# Patient Record
Sex: FEMALE | Race: WHITE | NOT HISPANIC OR LATINO | Employment: UNEMPLOYED | URBAN - METROPOLITAN AREA
[De-identification: names, ages, dates, MRNs, and addresses within clinical notes are randomized per-mention and may not be internally consistent; named-entity substitution may affect disease eponyms.]

---
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
## 2021-05-03 ENCOUNTER — HOSPITAL ENCOUNTER (EMERGENCY)
Facility: HOSPITAL | Age: 21
Discharge: HOME | End: 2021-05-04
Attending: EMERGENCY MEDICINE
Payer: COMMERCIAL

## 2021-05-03 DIAGNOSIS — R20.0 LEFT SIDED NUMBNESS: ICD-10-CM

## 2021-05-03 DIAGNOSIS — R51.9 ACUTE NONINTRACTABLE HEADACHE, UNSPECIFIED HEADACHE TYPE: ICD-10-CM

## 2021-05-03 DIAGNOSIS — H57.04 MYDRIASIS: Primary | ICD-10-CM

## 2021-05-03 LAB
ALBUMIN SERPL BCP-MCNC: 4.3 G/DL (ref 3.5–5.2)
ALP SERPL-CCNC: 59 U/L (ref 55–135)
ALT SERPL W/O P-5'-P-CCNC: 16 U/L (ref 10–44)
ANION GAP SERPL CALC-SCNC: 11 MMOL/L (ref 8–16)
APTT PPP: 29.1 SEC (ref 25.6–35.8)
AST SERPL-CCNC: 19 U/L (ref 10–40)
B-HCG UR QL: NEGATIVE
BASOPHILS # BLD AUTO: 0.03 K/UL (ref 0–0.2)
BASOPHILS NFR BLD: 0.4 % (ref 0–1.9)
BILIRUB SERPL-MCNC: 0.9 MG/DL (ref 0.1–1)
BUN SERPL-MCNC: 10 MG/DL (ref 6–20)
CALCIUM SERPL-MCNC: 9.1 MG/DL (ref 8.7–10.5)
CHLORIDE SERPL-SCNC: 106 MMOL/L (ref 95–110)
CO2 SERPL-SCNC: 25 MMOL/L (ref 23–29)
CREAT SERPL-MCNC: 0.9 MG/DL (ref 0.5–1.4)
CTP QC/QA: YES
DIFFERENTIAL METHOD: NORMAL
EOSINOPHIL # BLD AUTO: 0 K/UL (ref 0–0.5)
EOSINOPHIL NFR BLD: 0.4 % (ref 0–8)
ERYTHROCYTE [DISTWIDTH] IN BLOOD BY AUTOMATED COUNT: 11.7 % (ref 11.5–14.5)
EST. GFR  (AFRICAN AMERICAN): >60 ML/MIN/1.73 M^2
EST. GFR  (NON AFRICAN AMERICAN): >60 ML/MIN/1.73 M^2
GLUCOSE SERPL-MCNC: 96 MG/DL (ref 70–110)
HCT VFR BLD AUTO: 40.3 % (ref 37–48.5)
HGB BLD-MCNC: 13.8 G/DL (ref 12–16)
IMM GRANULOCYTES # BLD AUTO: 0.01 K/UL (ref 0–0.04)
IMM GRANULOCYTES NFR BLD AUTO: 0.1 % (ref 0–0.5)
INR PPP: 1.2
LYMPHOCYTES # BLD AUTO: 2.7 K/UL (ref 1–4.8)
LYMPHOCYTES NFR BLD: 36.3 % (ref 18–48)
MCH RBC QN AUTO: 30.9 PG (ref 27–31)
MCHC RBC AUTO-ENTMCNC: 34.2 G/DL (ref 32–36)
MCV RBC AUTO: 90 FL (ref 82–98)
MONOCYTES # BLD AUTO: 0.4 K/UL (ref 0.3–1)
MONOCYTES NFR BLD: 4.6 % (ref 4–15)
NEUTROPHILS # BLD AUTO: 4.4 K/UL (ref 1.8–7.7)
NEUTROPHILS NFR BLD: 58.2 % (ref 38–73)
NRBC BLD-RTO: 0 /100 WBC
PLATELET # BLD AUTO: 187 K/UL (ref 150–450)
PMV BLD AUTO: 12.5 FL (ref 9.2–12.9)
POTASSIUM SERPL-SCNC: 3.1 MMOL/L (ref 3.5–5.1)
PROT SERPL-MCNC: 7.1 G/DL (ref 6–8.4)
PROTHROMBIN TIME: 14.6 SEC (ref 11.8–14.3)
RBC # BLD AUTO: 4.47 M/UL (ref 4–5.4)
SODIUM SERPL-SCNC: 142 MMOL/L (ref 136–145)
WBC # BLD AUTO: 7.54 K/UL (ref 3.9–12.7)

## 2021-05-03 PROCEDURE — 85610 PROTHROMBIN TIME: CPT | Performed by: STUDENT IN AN ORGANIZED HEALTH CARE EDUCATION/TRAINING PROGRAM

## 2021-05-03 PROCEDURE — 25000003 PHARM REV CODE 250

## 2021-05-03 PROCEDURE — 81025 URINE PREGNANCY TEST: CPT | Performed by: NURSE PRACTITIONER

## 2021-05-03 PROCEDURE — 36415 COLL VENOUS BLD VENIPUNCTURE: CPT | Performed by: STUDENT IN AN ORGANIZED HEALTH CARE EDUCATION/TRAINING PROGRAM

## 2021-05-03 PROCEDURE — 85025 COMPLETE CBC W/AUTO DIFF WBC: CPT | Performed by: STUDENT IN AN ORGANIZED HEALTH CARE EDUCATION/TRAINING PROGRAM

## 2021-05-03 PROCEDURE — 99285 EMERGENCY DEPT VISIT HI MDM: CPT | Mod: 25

## 2021-05-03 PROCEDURE — 25500020 PHARM REV CODE 255: Performed by: EMERGENCY MEDICINE

## 2021-05-03 PROCEDURE — 80053 COMPREHEN METABOLIC PANEL: CPT | Performed by: STUDENT IN AN ORGANIZED HEALTH CARE EDUCATION/TRAINING PROGRAM

## 2021-05-03 PROCEDURE — 80307 DRUG TEST PRSMV CHEM ANLYZR: CPT | Performed by: NURSE PRACTITIONER

## 2021-05-03 PROCEDURE — 85730 THROMBOPLASTIN TIME PARTIAL: CPT | Performed by: STUDENT IN AN ORGANIZED HEALTH CARE EDUCATION/TRAINING PROGRAM

## 2021-05-03 RX ADMIN — IOHEXOL 65 ML: 350 INJECTION, SOLUTION INTRAVENOUS at 11:05

## 2021-05-04 VITALS
DIASTOLIC BLOOD PRESSURE: 72 MMHG | HEIGHT: 61 IN | TEMPERATURE: 98 F | HEART RATE: 69 BPM | RESPIRATION RATE: 18 BRPM | WEIGHT: 142 LBS | BODY MASS INDEX: 26.81 KG/M2 | SYSTOLIC BLOOD PRESSURE: 129 MMHG | OXYGEN SATURATION: 97 %

## 2021-05-04 LAB
AMPHET+METHAMPHET UR QL: NEGATIVE
BARBITURATES UR QL SCN>200 NG/ML: NEGATIVE
BENZODIAZ UR QL SCN>200 NG/ML: NEGATIVE
BZE UR QL SCN: NEGATIVE
CANNABINOIDS UR QL SCN: NEGATIVE
CREAT UR-MCNC: 105 MG/DL (ref 15–325)
OPIATES UR QL SCN: NEGATIVE
PCP UR QL SCN>25 NG/ML: NEGATIVE
TOXICOLOGY INFORMATION: NORMAL

## 2021-05-04 PROCEDURE — 25000003 PHARM REV CODE 250: Performed by: STUDENT IN AN ORGANIZED HEALTH CARE EDUCATION/TRAINING PROGRAM

## 2021-05-04 PROCEDURE — 96374 THER/PROPH/DIAG INJ IV PUSH: CPT

## 2021-05-04 PROCEDURE — 63600175 PHARM REV CODE 636 W HCPCS: Performed by: STUDENT IN AN ORGANIZED HEALTH CARE EDUCATION/TRAINING PROGRAM

## 2021-05-04 RX ORDER — KETOROLAC TROMETHAMINE 30 MG/ML
15 INJECTION, SOLUTION INTRAMUSCULAR; INTRAVENOUS
Status: COMPLETED | OUTPATIENT
Start: 2021-05-04 | End: 2021-05-04

## 2021-05-04 RX ORDER — TETRACAINE HYDROCHLORIDE 5 MG/ML
2 SOLUTION OPHTHALMIC
Status: DISCONTINUED | OUTPATIENT
Start: 2021-05-04 | End: 2021-05-04 | Stop reason: HOSPADM

## 2021-05-04 RX ADMIN — FLUORESCEIN SODIUM 1 EACH: 1 STRIP OPHTHALMIC at 12:05

## 2021-05-04 RX ADMIN — KETOROLAC TROMETHAMINE 15 MG: 30 INJECTION, SOLUTION INTRAMUSCULAR; INTRAVENOUS at 12:05

## 2023-12-08 ENCOUNTER — HOSPITAL ENCOUNTER (EMERGENCY)
Facility: HOSPITAL | Age: 23
Discharge: LEFT BEFORE DISCHARGE | End: 2023-12-08
Payer: COMMERCIAL

## 2023-12-08 ENCOUNTER — HOSPITAL ENCOUNTER (EMERGENCY)
Facility: HOSPITAL | Age: 23
Discharge: HOME | End: 2023-12-09
Attending: STUDENT IN AN ORGANIZED HEALTH CARE EDUCATION/TRAINING PROGRAM
Payer: COMMERCIAL

## 2023-12-08 VITALS
HEIGHT: 60 IN | SYSTOLIC BLOOD PRESSURE: 128 MMHG | BODY MASS INDEX: 27.09 KG/M2 | OXYGEN SATURATION: 95 % | HEART RATE: 82 BPM | WEIGHT: 138 LBS | TEMPERATURE: 98 F | RESPIRATION RATE: 16 BRPM | DIASTOLIC BLOOD PRESSURE: 85 MMHG

## 2023-12-08 DIAGNOSIS — Z30.432 ENCOUNTER FOR IUD REMOVAL: ICD-10-CM

## 2023-12-08 DIAGNOSIS — R10.2 PELVIC PAIN: Primary | ICD-10-CM

## 2023-12-08 LAB
B-HCG UR QL: NEGATIVE
BILIRUB UR QL STRIP: NEGATIVE
CLARITY UR: CLEAR
COLOR UR: YELLOW
CTP QC/QA: YES
GLUCOSE UR QL STRIP: NEGATIVE
HGB UR QL STRIP: NEGATIVE
KETONES UR QL STRIP: NEGATIVE
LEUKOCYTE ESTERASE UR QL STRIP: NEGATIVE
NITRITE UR QL STRIP: NEGATIVE
PH UR STRIP: 7 [PH] (ref 5–8)
PROT UR QL STRIP: NEGATIVE
SP GR UR STRIP: 1.02 (ref 1–1.03)
URN SPEC COLLECT METH UR: NORMAL
UROBILINOGEN UR STRIP-ACNC: NEGATIVE EU/DL

## 2023-12-08 PROCEDURE — 99284 EMERGENCY DEPT VISIT MOD MDM: CPT

## 2023-12-08 PROCEDURE — 99281 EMR DPT VST MAYX REQ PHY/QHP: CPT

## 2023-12-08 PROCEDURE — 58301 REMOVE INTRAUTERINE DEVICE: CPT

## 2023-12-08 PROCEDURE — 81025 URINE PREGNANCY TEST: CPT | Performed by: NURSE PRACTITIONER

## 2023-12-08 PROCEDURE — 81003 URINALYSIS AUTO W/O SCOPE: CPT | Performed by: NURSE PRACTITIONER

## 2023-12-08 NOTE — FIRST PROVIDER EVALUATION
Emergency Department TeleTriage Encounter Note      CHIEF COMPLAINT    Chief Complaint   Patient presents with    Pelvic Pain     X 1 MOS    IUD PROBLEM     MAY HAVE MOVED, PT WANTS TAKEN OUT       VITAL SIGNS   Initial Vitals [12/08/23 1659]   BP Pulse Resp Temp SpO2   128/85 82 16 98.4 °F (36.9 °C) 95 %      MAP       --            ALLERGIES    Review of patient's allergies indicates:  No Known Allergies    PROVIDER TRIAGE NOTE  Verbal consent for the teletriage evaluation was given by the patient at the start of the evaluation.  All efforts will be made to maintain patient's privacy during the evaluation.      This is a teletriage evaluation of a 23 y.o. female presenting to the ED with c/o pelvic pain, thinks IUD may have shifted or moved.  [facility] started 11/7/2023 [facility] [place] Limited physical exam via telehealth: The patient is awake, alert, answering questions appropriately and is not in respiratory distress.  As the Teletriage provider, I performed an initial assessment and ordered appropriate labs and imaging studies, if any, to facilitate the patient's care once placed in the ED. Once a room is available, care and a full evaluation will be completed by an alternate ED provider.  Any additional orders and the final disposition will be determined by that provider.  All imaging and labs will not be followed-up by the Teletriage Team, including myself.          ORDERS  Labs Reviewed   URINALYSIS, REFLEX TO URINE CULTURE   POCT URINE PREGNANCY       ED Orders (720h ago, onward)      Start Ordered     Status Ordering Provider    12/08/23 1712 12/08/23 1711  POCT urine pregnancy  Once         Ordered ALEJANDRO STUART    12/08/23 1712 12/08/23 1711  Urinalysis, Reflex to Urine Culture Urine, Clean Catch  STAT         Ordered ALEJANDRO STUART              Virtual Visit Note: The provider triage portion of this emergency department evaluation and documentation was performed via [employer], a  HIPAA-compliant telemedicine application, in concert with a tele-presenter in the room. A face to face patient evaluation with one of my colleagues will occur once the patient is placed in an emergency department room.      DISCLAIMER: This note was prepared with [employer] voice recognition transcription software. Garbled syntax, mangled pronouns, and other bizarre constructions may be attributed to that software system.

## 2023-12-09 VITALS
BODY MASS INDEX: 27.09 KG/M2 | TEMPERATURE: 98 F | WEIGHT: 138 LBS | RESPIRATION RATE: 17 BRPM | OXYGEN SATURATION: 100 % | DIASTOLIC BLOOD PRESSURE: 61 MMHG | SYSTOLIC BLOOD PRESSURE: 119 MMHG | HEIGHT: 60 IN | HEART RATE: 73 BPM

## 2023-12-09 LAB
BACTERIA GENITAL QL WET PREP: ABNORMAL
CLUE CELLS VAG QL WET PREP: ABNORMAL
FILAMENT FUNGI VAG WET PREP-#/AREA: ABNORMAL
SPECIMEN SOURCE: ABNORMAL
T VAGINALIS GENITAL QL WET PREP: ABNORMAL
WBC #/AREA VAG WET PREP: ABNORMAL
YEAST GENITAL QL WET PREP: ABNORMAL

## 2023-12-09 PROCEDURE — 87210 SMEAR WET MOUNT SALINE/INK: CPT | Performed by: STUDENT IN AN ORGANIZED HEALTH CARE EDUCATION/TRAINING PROGRAM

## 2023-12-09 PROCEDURE — 25000003 PHARM REV CODE 250: Performed by: STUDENT IN AN ORGANIZED HEALTH CARE EDUCATION/TRAINING PROGRAM

## 2023-12-09 PROCEDURE — 87591 N.GONORRHOEAE DNA AMP PROB: CPT | Performed by: STUDENT IN AN ORGANIZED HEALTH CARE EDUCATION/TRAINING PROGRAM

## 2023-12-09 RX ORDER — KETOROLAC TROMETHAMINE 10 MG/1
10 TABLET, FILM COATED ORAL
Status: COMPLETED | OUTPATIENT
Start: 2023-12-09 | End: 2023-12-09

## 2023-12-09 RX ADMIN — KETOROLAC TROMETHAMINE 10 MG: 10 TABLET, FILM COATED ORAL at 12:12

## 2023-12-09 NOTE — ED PROVIDER NOTES
"Encounter Date: 12/8/2023       History     Chief Complaint   Patient presents with    Abdominal Pain     " I feel like my IUD is moving" pt reports pain since insertion on November 7th      23-year-old female presents with left-sided pelvic pain, ongoing for a few weeks, no associated dysuria.  No trauma, fever or chills, history of IUD.  No associated vaginal discharge    The history is provided by the patient.     Review of patient's allergies indicates:  No Known Allergies  Past Medical History:   Diagnosis Date    Allergy     HSV-1 infection      Past Surgical History:   Procedure Laterality Date    TONSILLECTOMY       Family History   Problem Relation Age of Onset    Coronary artery disease Maternal Grandfather      Social History     Tobacco Use    Smoking status: Never    Smokeless tobacco: Never   Substance Use Topics    Alcohol use: No    Drug use: No     Review of Systems   All other systems reviewed and are negative.      Physical Exam     Initial Vitals [12/08/23 2027]   BP Pulse Resp Temp SpO2   134/64 73 20 97.8 °F (36.6 °C) 100 %      MAP       --         Physical Exam    Nursing note and vitals reviewed.  Constitutional: She appears well-developed and well-nourished.   HENT:   Head: Normocephalic and atraumatic.   Eyes: Right eye exhibits no discharge. Left eye exhibits no discharge.   Cardiovascular:  Normal rate and regular rhythm.           Pulmonary/Chest: Effort normal. No stridor. No respiratory distress.   Abdominal: Abdomen is soft. There is no abdominal tenderness.   Genitourinary:    Genitourinary Comments: Left lower pelvic tenderness to palpation, no guarding, IUD in place, minimal discharge around cervix, no cervical motion tenderness     Musculoskeletal:         General: No tenderness.     Neurological: She is alert.   Skin: Skin is warm. No rash noted. No erythema.         ED Course   Foreign Body    Date/Time: 12/8/2023 8:23 PM    Performed by: Nacho Soto Jr.,   Authorized " by: Nacho Soto Jr., DO  Consent Done: Yes  Consent: Verbal consent obtained.  Consent given by: patient  Body area: vagina    Patient sedated: no  Patient restrained: no  Localization method: Direct visualization.  Removal mechanism: forceps  Complexity: simple  Objects recovered: IUD  Post-procedure assessment: foreign body removed  Patient tolerance: Patient tolerated the procedure well with no immediate complications  Comments: IUD appeared intact      Labs Reviewed - No data to display       Imaging Results              US PELVIS COMP W/TRANSVAG NON-OB AND 3D FOR IUD (XPD) (Final result)  Result time 12/09/23 08:07:18   Procedure changed from US Pelvis Complete Non OB     Final result by Juan Calabrese MD (12/09/23 08:07:18)                   Impression:      1. IUD in place.  2. Small left ovarian cyst with small amount of free fluid within the left adnexa.  The preliminary and final reports are concordant.      Electronically signed by: Juan Calabrese  Date:    12/09/2023  Time:    08:07               Narrative:    EXAMINATION:  US PELVIS COMP W/TRANSVAG NON-OB AND 3D FOR IUD (XPD)    CLINICAL HISTORY:  Pelvic pain.    TECHNIQUE:  Transabdominal and transvaginal ultrasound of the pelvis performed.    COMPARISON:  None.    FINDINGS:  The uterus measures 6.5 x 2.6 x 3.4 cm.  Endometrial complex measures 5 mm.  There is an IUD in place which appears appropriately position.    The ovaries are identified and normal in size and echogenicity with normal blood flow.  There is a cyst of the left ovary measuring 21 x 13 mm.  Small amount of free fluid within the left adnexa.    No significant free fluid in cul-de-sac.                                       Medications   ketorolac tablet 10 mg (10 mg Oral Given 12/9/23 0020)     Medical Decision Making  23-year-old female presents with left-sided pelvic pain ongoing for 1 month.  Patient seen in outside hospital earlier today and results available with  negative UA and negative UPT.  Patient requesting IUD to be removed, ultrasound imaging obtained demonstrates IUD in appropriate position and ovarian cyst on left side with no ovarian torsion.  After verbal consent obtained IUD removed with no apparent complication.  Patient informed she could be pregnant at this time and will not pursue any contraceptives at this time per her request.  Wet prep negative for Trichomonas or bacterial vaginosis and GC pending.  We will not treat empirically through shared decision-making.  Low suspicion for any STD at this time given recent STD tested negative within the last month and no new sexual partners    Amount and/or Complexity of Data Reviewed  Radiology: ordered.    Risk  Prescription drug management.                                      Clinical Impression:  Final diagnoses:  [R10.2] Pelvic pain (Primary)  [Z30.432] Encounter for IUD removal          ED Disposition Condition    Discharge           ED Prescriptions    None       Follow-up Information    None          Nacho Soto Jr., DO  12/09/23 2008

## 2023-12-12 LAB
C TRACH RRNA SPEC QL NAA+PROBE: NEGATIVE
N GONORRHOEA RRNA SPEC QL NAA+PROBE: NEGATIVE
N.GONORROHEAE, AMP RNA SOURCE: NORMAL
SPECIMEN SOURCE: NORMAL

## 2025-01-17 DIAGNOSIS — N63.0 BREAST LUMP: Primary | ICD-10-CM

## 2025-01-27 ENCOUNTER — HOSPITAL ENCOUNTER (OUTPATIENT)
Dept: RADIOLOGY | Facility: HOSPITAL | Age: 25
Discharge: HOME | End: 2025-01-27
Attending: FAMILY MEDICINE
Payer: OTHER GOVERNMENT

## 2025-01-27 DIAGNOSIS — N63.0 BREAST LUMP: ICD-10-CM

## 2025-01-27 PROCEDURE — 76641 ULTRASOUND BREAST COMPLETE: CPT | Mod: TC,50

## 2025-06-17 ENCOUNTER — HOSPITAL ENCOUNTER (EMERGENCY)
Facility: HOSPITAL | Age: 25
Discharge: HOME | End: 2025-06-17
Attending: EMERGENCY MEDICINE
Payer: OTHER GOVERNMENT

## 2025-06-17 VITALS
RESPIRATION RATE: 18 BRPM | HEART RATE: 71 BPM | DIASTOLIC BLOOD PRESSURE: 58 MMHG | SYSTOLIC BLOOD PRESSURE: 111 MMHG | WEIGHT: 130 LBS | BODY MASS INDEX: 25.39 KG/M2 | OXYGEN SATURATION: 100 % | TEMPERATURE: 99 F

## 2025-06-17 DIAGNOSIS — O20.0 THREATENED MISCARRIAGE IN EARLY PREGNANCY: ICD-10-CM

## 2025-06-17 DIAGNOSIS — N93.9 VAGINAL BLEEDING: Primary | ICD-10-CM

## 2025-06-17 LAB
ABSOLUTE EOSINOPHIL (SMH): 0.13 K/UL
ABSOLUTE MONOCYTE (SMH): 0.47 K/UL (ref 0.3–1)
ABSOLUTE NEUTROPHIL COUNT (SMH): 3.9 K/UL (ref 1.8–7.7)
ALBUMIN SERPL-MCNC: 4.2 G/DL (ref 3.5–5.2)
ALP SERPL-CCNC: 28 UNIT/L (ref 55–135)
ALT SERPL-CCNC: 9 UNIT/L (ref 10–44)
ANION GAP (SMH): 6 MMOL/L (ref 8–16)
AST SERPL-CCNC: 11 UNIT/L (ref 10–40)
B-HCG FREE SERPL-ACNC: NORMAL MIU/ML
B-HCG UR QL: POSITIVE
BACTERIA #/AREA URNS AUTO: ABNORMAL /HPF
BASOPHILS # BLD AUTO: 0.06 K/UL
BASOPHILS NFR BLD AUTO: 0.8 %
BILIRUB SERPL-MCNC: 0.3 MG/DL (ref 0.1–1)
BILIRUB UR QL STRIP.AUTO: NEGATIVE
BUN SERPL-MCNC: 9 MG/DL (ref 6–20)
CALCIUM SERPL-MCNC: 9.2 MG/DL (ref 8.7–10.5)
CHLORIDE SERPL-SCNC: 108 MMOL/L (ref 95–110)
CLARITY UR: CLEAR
CO2 SERPL-SCNC: 25 MMOL/L (ref 23–29)
COLOR UR AUTO: COLORLESS
CREAT SERPL-MCNC: 0.7 MG/DL (ref 0.5–1.4)
CTP QC/QA: YES
ERYTHROCYTE [DISTWIDTH] IN BLOOD BY AUTOMATED COUNT: 12.2 % (ref 11.5–14.5)
GFR SERPLBLD CREATININE-BSD FMLA CKD-EPI: >60 ML/MIN/1.73/M2
GLUCOSE SERPL-MCNC: 92 MG/DL (ref 70–110)
GLUCOSE UR QL STRIP: NEGATIVE
HCT VFR BLD AUTO: 38 % (ref 37–48.5)
HGB BLD-MCNC: 13.2 GM/DL (ref 12–16)
HGB UR QL STRIP: ABNORMAL
IMM GRANULOCYTES # BLD AUTO: 0.01 K/UL (ref 0–0.04)
IMM GRANULOCYTES NFR BLD AUTO: 0.1 % (ref 0–0.5)
KETONES UR QL STRIP: NEGATIVE
LEUKOCYTE ESTERASE UR QL STRIP: NEGATIVE
LYMPHOCYTES # BLD AUTO: 2.67 K/UL (ref 1–4.8)
MCH RBC QN AUTO: 31.1 PG (ref 27–31)
MCHC RBC AUTO-ENTMCNC: 34.7 G/DL (ref 32–36)
MCV RBC AUTO: 90 FL (ref 82–98)
MICROSCOPIC COMMENT: ABNORMAL
NITRITE UR QL STRIP: NEGATIVE
NUCLEATED RBC (/100WBC) (SMH): 0 /100 WBC
PH UR STRIP: 7 [PH]
PLATELET # BLD AUTO: 193 K/UL (ref 150–450)
PMV BLD AUTO: 11.6 FL (ref 9.2–12.9)
POTASSIUM SERPL-SCNC: 3.8 MMOL/L (ref 3.5–5.1)
PROT SERPL-MCNC: 6.6 GM/DL (ref 6–8.4)
PROT UR QL STRIP: NEGATIVE
RBC # BLD AUTO: 4.24 M/UL (ref 4–5.4)
RBC #/AREA URNS AUTO: 26 /HPF
RELATIVE EOSINOPHIL (SMH): 1.8 % (ref 0–8)
RELATIVE LYMPHOCYTE (SMH): 37 % (ref 18–48)
RELATIVE MONOCYTE (SMH): 6.5 % (ref 4–15)
RELATIVE NEUTROPHIL (SMH): 53.8 % (ref 38–73)
RH TYPE: NORMAL
SODIUM SERPL-SCNC: 139 MMOL/L (ref 136–145)
SP GR UR STRIP: <1.005
SQUAMOUS #/AREA URNS AUTO: 2 /HPF
UROBILINOGEN UR STRIP-ACNC: NEGATIVE EU/DL
WBC # BLD AUTO: 7.21 K/UL (ref 3.9–12.7)
WBC #/AREA URNS AUTO: 2 /HPF

## 2025-06-17 PROCEDURE — 63600175 PHARM REV CODE 636 W HCPCS: Performed by: EMERGENCY MEDICINE

## 2025-06-17 PROCEDURE — 99284 EMERGENCY DEPT VISIT MOD MDM: CPT | Mod: 25

## 2025-06-17 PROCEDURE — 36415 COLL VENOUS BLD VENIPUNCTURE: CPT | Performed by: EMERGENCY MEDICINE

## 2025-06-17 PROCEDURE — 81001 URINALYSIS AUTO W/SCOPE: CPT | Performed by: EMERGENCY MEDICINE

## 2025-06-17 PROCEDURE — 85025 COMPLETE CBC W/AUTO DIFF WBC: CPT | Performed by: EMERGENCY MEDICINE

## 2025-06-17 PROCEDURE — 86901 BLOOD TYPING SEROLOGIC RH(D): CPT | Performed by: EMERGENCY MEDICINE

## 2025-06-17 PROCEDURE — 96360 HYDRATION IV INFUSION INIT: CPT

## 2025-06-17 PROCEDURE — 81025 URINE PREGNANCY TEST: CPT | Performed by: EMERGENCY MEDICINE

## 2025-06-17 PROCEDURE — 80053 COMPREHEN METABOLIC PANEL: CPT | Performed by: EMERGENCY MEDICINE

## 2025-06-17 PROCEDURE — 84702 CHORIONIC GONADOTROPIN TEST: CPT | Performed by: EMERGENCY MEDICINE

## 2025-06-17 RX ADMIN — SODIUM CHLORIDE, SODIUM LACTATE, POTASSIUM CHLORIDE, AND CALCIUM CHLORIDE 1000 ML: .6; .31; .03; .02 INJECTION, SOLUTION INTRAVENOUS at 07:06

## 2025-06-17 NOTE — ED PROVIDER NOTES
Encounter Date: 2025       History     Chief Complaint   Patient presents with    Threatened Miscarriage     7 weeks pregnant. Pt woke up to pee and said she started bleeding with slight cramping. Pt is concerned she may be having a miscarriage.     24-year-old  LMP believed to be a proximally 7 weeks ago.  Chief complaint is lower abdominal crit of pink vaginal bleeding starting around 0400 a.m. today.  Patient states she is 7 weeks pregnant this is her 1st pregnancy.  She is not visit her OB doctor as of yet.  She has no complaints otherwise.  She is in good health.  Currently this time denies any active vaginal bleeding.  No abdominal pain.  No recent illness.  Denies any other constitutional symptoms.  States had moderate amount of bleeding with passage of clot.        Review of patient's allergies indicates:  No Known Allergies  Past Medical History:   Diagnosis Date    Allergy     HSV-1 infection      Past Surgical History:   Procedure Laterality Date    TONSILLECTOMY       Family History   Problem Relation Name Age of Onset    Coronary artery disease Maternal Grandfather       Social History[1]  Review of Systems   Constitutional:  Negative for chills and fever.   HENT:  Negative for ear pain, rhinorrhea and sore throat.    Eyes:  Negative for pain and visual disturbance.   Respiratory:  Negative for cough and shortness of breath.    Cardiovascular:  Negative for chest pain and palpitations.   Gastrointestinal:  Positive for abdominal pain. Negative for constipation, diarrhea, nausea and vomiting.   Genitourinary:  Positive for vaginal bleeding. Negative for dysuria, frequency, hematuria and urgency.   Musculoskeletal:  Negative for back pain, joint swelling and myalgias.   Skin:  Negative for rash.   Neurological:  Negative for dizziness, seizures, weakness and headaches.   Psychiatric/Behavioral:  Negative for dysphoric mood. The patient is not nervous/anxious.        Physical Exam     Initial  Vitals [06/17/25 0221]   BP Pulse Resp Temp SpO2   (!) 137/95 98 18 98.6 °F (37 °C) 100 %      MAP       --         Physical Exam    Nursing note and vitals reviewed.  Constitutional: She appears well-developed and well-nourished.   HENT:   Head: Normocephalic and atraumatic.   Eyes: Conjunctivae, EOM and lids are normal. Pupils are equal, round, and reactive to light.   Neck: Trachea normal. Neck supple. No thyroid mass and no thyromegaly present.   Normal range of motion.  Cardiovascular:  Normal rate, regular rhythm and normal heart sounds.           Pulmonary/Chest: Effort normal and breath sounds normal.   Abdominal: Abdomen is soft. She exhibits no distension. There is no abdominal tenderness.   Mild suprapubic tenderness There is no rebound and no guarding.   Genitourinary:    Vagina normal.      No vaginal discharge.     Musculoskeletal:         General: Normal range of motion.      Cervical back: Normal range of motion and neck supple.     Neurological: She is alert and oriented to person, place, and time. She has normal strength and normal reflexes. No cranial nerve deficit or sensory deficit.   Skin: Skin is warm and dry.   Psychiatric: She has a normal mood and affect. Her speech is normal and behavior is normal. Judgment and thought content normal.         ED Course   Procedures  Labs Reviewed   COMPREHENSIVE METABOLIC PANEL - Abnormal       Result Value    Sodium 139      Potassium 3.8      Chloride 108      CO2 25      Glucose 92      BUN 9      Creatinine 0.7      Calcium 9.2      Protein Total 6.6      Albumin 4.2      Bilirubin Total 0.3      ALP 28 (*)     AST 11      ALT 9 (*)     Anion Gap 6 (*)     eGFR >60     URINALYSIS, REFLEX TO URINE CULTURE - Abnormal    Color, UA Colorless (*)     Appearance, UA Clear      Spec Grav UA <1.005 (*)     pH, UA 7.0      Protein, UA Negative      Glucose, UA Negative      Ketones, UA Negative      Blood, UA 3+ (*)     Bilirubin, UA Negative      Urobilinogen,  UA Negative      Nitrites, UA Negative      Leukocyte Esterase, UA Negative     CBC WITH DIFFERENTIAL - Abnormal    WBC 7.21      RBC 4.24      Hgb 13.2      Hct 38.0      MCV 90      MCH 31.1 (*)     MCHC 34.7      RDW 12.2      Platelet Count 193      MPV 11.6      Nucleated RBC 0      Neut % 53.8      Lymph % 37.0      Mono % 6.5      Eos % 1.8      Basophil % 0.8      Imm Grans % 0.1      Neut # 3.9      Lymph # 2.67      Mono # 0.47      Eos # 0.13      Baso # 0.06      Imm Grans # 0.01     URINALYSIS MICROSCOPIC - Abnormal    RBC, UA 26 (*)     WBC, UA 2      Bacteria, UA Occasional      Squamous Epithelial Cells, UA 2      Microscopic Comment       POCT URINE PREGNANCY - Abnormal    POC Preg Test, Ur Positive (*)      Acceptable Yes     CBC W/ AUTO DIFFERENTIAL    Narrative:     The following orders were created for panel order CBC auto differential.  Procedure                               Abnormality         Status                     ---------                               -----------         ------                     CBC with Differential[[id number]]       Abnormal            Final result                 Please view results for these tests on the individual orders.   HCG, QUANTITATIVE    Beta HCG Quant 106,071.89     RH TYPING    Rh Type POS            Imaging Results              US OB Transvaginal (Final result)  Result time 06/17/25 07:14:25   Procedure changed from US OB <14 Wks, TransAbd, Single Gestation     Final result by Paola Lerner MD (06/17/25 07:14:25)                   Impression:      Single viable intrauterine gestation at 6 weeks 4 days +/-3 days given estimated date of delivery of 02/06/2026.    Large 2.4 x 2.3 x 4.1 cm hypoechoic area adjacent to gestational sac compatible with subchorionic hemorrhage    1.9 x 2.3 cm hypoechoic area in the right ovary compatible with hemorrhagic cyst      Electronically signed by: Paola  Yann  Date:    06/17/2025  Time:    07:14               Narrative:    EXAMINATION:  US OB TRANSVAGINAL    CLINICAL HISTORY:  bleeding;  Abnormal uterine and vaginal bleeding, unspecified    COMPARISON:  None    FINDINGS:  There is a single intrauterine gestational sac with fetal pole and yolk sac.  The gestational sac measures 2.2 cm given a gestational age of 6 weeks 5 days.  The fetal pole measures 6 mm given a gestational age of 6 weeks 3 days.  This gives estimated gestational age of 6 weeks 4 days +/-3 days with estimated dated delivery of 02/06/2026.  There is positive cardiac activity at 123 beats per minute.    There is a 2.4 x 2.3 x 4.1 cm hypoechoic area adjacent to the gestational sac suggestive of subchorionic hemorrhage.    The right ovary measures 2.7 x 2.1 x 2.6 cm.  There is a 1.9 x 1.9 x 2.3 cm hypoechoic area within the right ovary suggestive of hemorrhagic corpus luteal    The left ovary measures cyst.  2.7 x 1.1 x 1.9 cm.    There is normal flow to both ovaries.                                       Medications   lactated ringers bolus 1,000 mL (0 mLs Intravenous Stopped 6/17/25 0900)     Medical Decision Making  Amount and/or Complexity of Data Reviewed  Labs: ordered.  Radiology: ordered.               ED Course as of 06/17/25 1455   Tue Jun 17, 2025   0843 Patient seen evaluated emergency department.  Patient underwent ultrasound which showedSingle viable intrauterine gestation at 6 weeks 4 days +/-3 days given estimated date of delivery of 02/06/2026.     Large 2.4 x 2.3 x 4.1 cm hypoechoic area adjacent to gestational sac compatible with subchorionic hemorrhage     1.9 x 2.3 cm hypoechoic area in the right ovary compatible with hemorrhagic cyst Rh positive with quant 100,071. Patient underwent counseling in emergency department as well as IV hydration.  She is to follow up with the Ob gyn provider this week.  Maintain strict pelvic rest.  Given detailed return precautions.  Patient  discharged in good condition.   [RM]      ED Course User Index  [RM] Willard Dos Santos MD               Medical Decision Making:   Initial Assessment:   24-year-old  LMP believed to be a proximally 7 weeks ago.  Chief complaint is lower abdominal crit of pink vaginal bleeding starting around 0400 a.m. today.  Patient states she is 7 weeks pregnant this is her 1st pregnancy.  She is not visit her OB doctor as of yet.  She has no complaints otherwise.  She is in good health.  Currently this time denies any active vaginal bleeding.  No abdominal pain.  No recent illness.  Denies any other constitutional symptoms.  States had moderate amount of bleeding with passage of clot.    Differential Diagnosis:   Threatened miscarriage, miscarriage, ectopic pregnancy  Clinical Tests:   Lab Tests: Ordered and Reviewed  Radiological Study: Ordered and Reviewed  Medical Tests: Ordered and Reviewed             Clinical Impression:  Final diagnoses:  [N93.9] Vaginal bleeding (Primary)  [O20.0] Threatened miscarriage in early pregnancy          ED Disposition Condition    Discharge Stable          ED Prescriptions    None       Follow-up Information       Follow up With Specialties Details Why Contact Info    Meaghan Martins MD Obstetrics and Gynecology Schedule an appointment as soon as possible for a visit in 1 week For recheck/continuing care [address] [place] [id number]  [phone]                   Willard Dos Santos MD  [phone]         [1]   Social History  Tobacco Use    Smoking status: Never    Smokeless tobacco: Never   Substance Use Topics    Alcohol use: No    Drug use: No        Willard Dos Santos MD  [phone]

## 2025-08-12 ENCOUNTER — NURSE TRIAGE (OUTPATIENT)
Dept: ADMINISTRATIVE | Facility: CLINIC | Age: 25
End: 2025-08-12
Payer: OTHER GOVERNMENT

## 2025-08-17 ENCOUNTER — HOSPITAL ENCOUNTER (EMERGENCY)
Facility: HOSPITAL | Age: 25
Discharge: HOME | End: 2025-08-17
Attending: EMERGENCY MEDICINE
Payer: OTHER GOVERNMENT

## 2025-08-17 VITALS
OXYGEN SATURATION: 98 % | DIASTOLIC BLOOD PRESSURE: 60 MMHG | WEIGHT: 140 LBS | HEART RATE: 70 BPM | RESPIRATION RATE: 16 BRPM | HEIGHT: 60 IN | TEMPERATURE: 98 F | BODY MASS INDEX: 27.48 KG/M2 | SYSTOLIC BLOOD PRESSURE: 110 MMHG

## 2025-08-17 DIAGNOSIS — K59.00 CONSTIPATION, UNSPECIFIED CONSTIPATION TYPE: Primary | ICD-10-CM

## 2025-08-17 PROCEDURE — 96360 HYDRATION IV INFUSION INIT: CPT

## 2025-08-17 PROCEDURE — 96361 HYDRATE IV INFUSION ADD-ON: CPT

## 2025-08-17 PROCEDURE — 25000003 PHARM REV CODE 250

## 2025-08-17 PROCEDURE — 99284 EMERGENCY DEPT VISIT MOD MDM: CPT | Mod: 25

## 2025-08-17 PROCEDURE — 63600175 PHARM REV CODE 636 W HCPCS

## 2025-08-17 RX ORDER — POLYETHYLENE GLYCOL 3350 17 G/17G
17 POWDER, FOR SOLUTION ORAL DAILY
Qty: 20 EACH | Refills: 0 | Status: SHIPPED | OUTPATIENT
Start: 2025-08-17

## 2025-08-17 RX ORDER — POLYETHYLENE GLYCOL 3350 17 G/17G
17 POWDER, FOR SOLUTION ORAL ONCE
Status: COMPLETED | OUTPATIENT
Start: 2025-08-17 | End: 2025-08-17

## 2025-08-17 RX ORDER — SENNOSIDES 8.6 MG/1
8.6 TABLET ORAL ONCE
Status: COMPLETED | OUTPATIENT
Start: 2025-08-17 | End: 2025-08-17

## 2025-08-17 RX ADMIN — STANDARDIZED SENNA CONCENTRATE 8.6 MG: 8.6 TABLET ORAL at 09:08

## 2025-08-17 RX ADMIN — PSYLLIUM HUSK 1 PACKET: 3.4 POWDER ORAL at 09:08

## 2025-08-17 RX ADMIN — SODIUM CHLORIDE, POTASSIUM CHLORIDE, SODIUM LACTATE AND CALCIUM CHLORIDE 1000 ML: 600; 310; 30; 20 INJECTION, SOLUTION INTRAVENOUS at 09:08

## 2025-08-17 RX ADMIN — POLYETHYLENE GLYCOL 3350 17 G: 17 POWDER, FOR SOLUTION ORAL at 09:08

## 2025-09-02 ENCOUNTER — CLINICAL SUPPORT (OUTPATIENT)
Dept: OBSTETRICS AND GYNECOLOGY | Facility: CLINIC | Age: 25
End: 2025-09-02
Payer: OTHER GOVERNMENT

## 2025-09-02 DIAGNOSIS — Z71.9 COUNSELED BY NURSE: Primary | ICD-10-CM

## 2025-09-02 DIAGNOSIS — Z33.3 PREGNANT STATE, GESTATIONAL CARRIER: Primary | ICD-10-CM
